# Patient Record
Sex: MALE | Race: BLACK OR AFRICAN AMERICAN | NOT HISPANIC OR LATINO | URBAN - METROPOLITAN AREA
[De-identification: names, ages, dates, MRNs, and addresses within clinical notes are randomized per-mention and may not be internally consistent; named-entity substitution may affect disease eponyms.]

---

## 2023-05-31 LAB
ALANINE AMINOTRANSFERASE (SGPT) (U/L) IN SER/PLAS: 14 U/L (ref 10–52)
ALBUMIN (G/DL) IN SER/PLAS: 4.1 G/DL (ref 3.4–5)
ALKALINE PHOSPHATASE (U/L) IN SER/PLAS: 84 U/L (ref 33–136)
ANION GAP IN SER/PLAS: 11 MMOL/L (ref 10–20)
ASPARTATE AMINOTRANSFERASE (SGOT) (U/L) IN SER/PLAS: 14 U/L (ref 9–39)
BILIRUBIN TOTAL (MG/DL) IN SER/PLAS: 0.5 MG/DL (ref 0–1.2)
CALCIUM (MG/DL) IN SER/PLAS: 9.8 MG/DL (ref 8.6–10.6)
CARBON DIOXIDE, TOTAL (MMOL/L) IN SER/PLAS: 29 MMOL/L (ref 21–32)
CHLORIDE (MMOL/L) IN SER/PLAS: 104 MMOL/L (ref 98–107)
CHOLESTEROL (MG/DL) IN SER/PLAS: 127 MG/DL (ref 0–199)
CHOLESTEROL IN HDL (MG/DL) IN SER/PLAS: 33.8 MG/DL
CHOLESTEROL/HDL RATIO: 3.8
CREATININE (MG/DL) IN SER/PLAS: 1.25 MG/DL (ref 0.5–1.3)
ERYTHROCYTE DISTRIBUTION WIDTH (RATIO) BY AUTOMATED COUNT: 13.6 % (ref 11.5–14.5)
ERYTHROCYTE MEAN CORPUSCULAR HEMOGLOBIN CONCENTRATION (G/DL) BY AUTOMATED: 33.7 G/DL (ref 32–36)
ERYTHROCYTE MEAN CORPUSCULAR VOLUME (FL) BY AUTOMATED COUNT: 87 FL (ref 80–100)
ERYTHROCYTES (10*6/UL) IN BLOOD BY AUTOMATED COUNT: 5.19 X10E12/L (ref 4.5–5.9)
ESTIMATED AVERAGE GLUCOSE FOR HBA1C: 131 MG/DL
GFR MALE: 63 ML/MIN/1.73M2
GLUCOSE (MG/DL) IN SER/PLAS: 101 MG/DL (ref 74–99)
HEMATOCRIT (%) IN BLOOD BY AUTOMATED COUNT: 45.1 % (ref 41–52)
HEMOGLOBIN (G/DL) IN BLOOD: 15.2 G/DL (ref 13.5–17.5)
HEMOGLOBIN A1C/HEMOGLOBIN TOTAL IN BLOOD: 6.2 %
LDL: 80 MG/DL (ref 0–99)
LEUKOCYTES (10*3/UL) IN BLOOD BY AUTOMATED COUNT: 9.7 X10E9/L (ref 4.4–11.3)
NRBC (PER 100 WBCS) BY AUTOMATED COUNT: 0 /100 WBC (ref 0–0)
PLATELETS (10*3/UL) IN BLOOD AUTOMATED COUNT: 307 X10E9/L (ref 150–450)
POTASSIUM (MMOL/L) IN SER/PLAS: 4.4 MMOL/L (ref 3.5–5.3)
PROSTATE SPECIFIC ANTIGEN,SCREEN: 1.1 NG/ML (ref 0–4)
PROTEIN TOTAL: 7 G/DL (ref 6.4–8.2)
SODIUM (MMOL/L) IN SER/PLAS: 140 MMOL/L (ref 136–145)
TRIGLYCERIDE (MG/DL) IN SER/PLAS: 68 MG/DL (ref 0–149)
UREA NITROGEN (MG/DL) IN SER/PLAS: 13 MG/DL (ref 6–23)
VLDL: 14 MG/DL (ref 0–40)

## 2023-11-12 PROBLEM — R21 RASH OF HANDS: Status: ACTIVE | Noted: 2023-11-12

## 2023-11-12 PROBLEM — N52.9 ERECTILE DYSFUNCTION: Status: ACTIVE | Noted: 2023-11-12

## 2023-11-12 PROBLEM — H52.203 ASTIGMATISM OF BOTH EYES: Status: ACTIVE | Noted: 2023-11-12

## 2023-11-12 PROBLEM — E11.9 DIABETES MELLITUS TYPE 2 WITHOUT RETINOPATHY (MULTI): Status: ACTIVE | Noted: 2023-11-12

## 2023-11-12 PROBLEM — B02.9 SHINGLES: Status: ACTIVE | Noted: 2023-11-12

## 2023-11-12 PROBLEM — L30.9 ECZEMA: Status: ACTIVE | Noted: 2023-11-12

## 2023-11-12 PROBLEM — H52.03 HYPEROPIA WITH PRESBYOPIA OF BOTH EYES: Status: ACTIVE | Noted: 2023-11-12

## 2023-11-12 PROBLEM — I10 ESSENTIAL HYPERTENSION: Status: ACTIVE | Noted: 2023-11-12

## 2023-11-12 PROBLEM — E11.42 DIABETIC PERIPHERAL NEUROPATHY (MULTI): Status: ACTIVE | Noted: 2023-11-12

## 2023-11-12 PROBLEM — M20.5X1 HALLUX LIMITUS, RIGHT: Status: ACTIVE | Noted: 2023-11-12

## 2023-11-12 PROBLEM — Z96.1 PSEUDOPHAKIA OF RIGHT EYE: Status: ACTIVE | Noted: 2023-11-12

## 2023-11-12 PROBLEM — B35.1 ONYCHOMYCOSIS: Status: ACTIVE | Noted: 2023-11-12

## 2023-11-12 PROBLEM — N43.40 SPERMATOCELE: Status: ACTIVE | Noted: 2023-11-12

## 2023-11-12 PROBLEM — E11.9 DIABETES MELLITUS (MULTI): Status: ACTIVE | Noted: 2023-11-12

## 2023-11-12 PROBLEM — H60.90 OTITIS EXTERNA: Status: ACTIVE | Noted: 2023-11-12

## 2023-11-12 PROBLEM — H52.4 HYPEROPIA WITH PRESBYOPIA OF BOTH EYES: Status: ACTIVE | Noted: 2023-11-12

## 2023-11-12 PROBLEM — E78.5 HYPERLIPIDEMIA: Status: ACTIVE | Noted: 2023-11-12

## 2023-11-12 PROBLEM — S06.0XAA CONCUSSION: Status: ACTIVE | Noted: 2023-11-12

## 2023-11-12 PROBLEM — G47.9 SLEEP DIFFICULTIES: Status: ACTIVE | Noted: 2023-11-12

## 2023-11-12 RX ORDER — CICLOPIROX 80 MG/ML
SOLUTION TOPICAL
COMMUNITY
Start: 2020-01-24 | End: 2024-01-10 | Stop reason: WASHOUT

## 2023-11-12 RX ORDER — ATORVASTATIN CALCIUM 20 MG/1
1 TABLET, FILM COATED ORAL NIGHTLY
COMMUNITY
Start: 2022-09-28

## 2023-11-12 RX ORDER — ASPIRIN 81 MG/1
1 TABLET ORAL DAILY
COMMUNITY
Start: 2018-04-02

## 2023-11-12 RX ORDER — BLOOD SUGAR DIAGNOSTIC
STRIP MISCELLANEOUS 2 TIMES DAILY
COMMUNITY
Start: 2018-04-02 | End: 2023-11-13 | Stop reason: ALTCHOICE

## 2023-11-12 RX ORDER — INSULIN GLARGINE 100 [IU]/ML
20 INJECTION, SOLUTION SUBCUTANEOUS NIGHTLY
COMMUNITY
Start: 2022-07-08 | End: 2024-01-10 | Stop reason: WASHOUT

## 2023-11-12 RX ORDER — NAPROXEN 500 MG/1
1 TABLET ORAL
COMMUNITY
Start: 2015-10-02 | End: 2024-01-10 | Stop reason: WASHOUT

## 2023-11-12 RX ORDER — POLYETHYLENE GLYCOL 3350, SODIUM SULFATE, SODIUM CHLORIDE, POTASSIUM CHLORIDE, SODIUM ASCORBATE, AND ASCORBIC ACID 7.5-2.691G
KIT ORAL
COMMUNITY
Start: 2020-06-19 | End: 2023-11-13 | Stop reason: ALTCHOICE

## 2023-11-12 RX ORDER — SIMVASTATIN 20 MG/1
1 TABLET, FILM COATED ORAL NIGHTLY
COMMUNITY
Start: 2018-04-26 | End: 2023-11-13 | Stop reason: ALTCHOICE

## 2023-11-12 RX ORDER — ALBUTEROL SULFATE 90 UG/1
2 AEROSOL, METERED RESPIRATORY (INHALATION)
COMMUNITY
Start: 2015-02-12 | End: 2024-01-10 | Stop reason: WASHOUT

## 2023-11-12 RX ORDER — INDOMETHACIN 25 MG/1
1 CAPSULE ORAL
COMMUNITY
Start: 2017-01-06 | End: 2023-11-13 | Stop reason: ALTCHOICE

## 2023-11-12 RX ORDER — TADALAFIL 5 MG/1
1 TABLET ORAL DAILY
COMMUNITY
Start: 2014-10-20

## 2023-11-12 RX ORDER — HYDROCHLOROTHIAZIDE 12.5 MG/1
1 TABLET ORAL DAILY
COMMUNITY
Start: 2020-06-24 | End: 2024-01-08

## 2023-11-12 RX ORDER — ALCOHOL 2.38 KG/3.79L
1 GEL TOPICAL DAILY
COMMUNITY
Start: 2020-01-24 | End: 2023-11-13 | Stop reason: ALTCHOICE

## 2023-11-12 RX ORDER — METFORMIN HYDROCHLORIDE 500 MG/1
1 TABLET ORAL EVERY 12 HOURS
COMMUNITY
Start: 2018-04-02 | End: 2024-01-10 | Stop reason: SDUPTHER

## 2023-11-12 RX ORDER — GABAPENTIN 100 MG/1
1 CAPSULE ORAL 3 TIMES DAILY
COMMUNITY
Start: 2020-02-28 | End: 2023-11-13 | Stop reason: ALTCHOICE

## 2023-11-12 RX ORDER — NEOMYCIN SULFATE, POLYMYXIN B SULFATE, HYDROCORTISONE 3.5; 10000; 1 MG/ML; [USP'U]/ML; MG/ML
3 SOLUTION/ DROPS AURICULAR (OTIC) 4 TIMES DAILY
COMMUNITY
Start: 2021-04-06 | End: 2023-11-13 | Stop reason: SDUPTHER

## 2023-11-12 RX ORDER — TRIAMCINOLONE ACETONIDE 1 MG/G
OINTMENT TOPICAL 2 TIMES DAILY
COMMUNITY
Start: 2018-04-25 | End: 2024-01-10 | Stop reason: WASHOUT

## 2023-11-12 RX ORDER — SEMAGLUTIDE 1.34 MG/ML
INJECTION, SOLUTION SUBCUTANEOUS
COMMUNITY
Start: 2022-09-28 | End: 2024-01-10 | Stop reason: SDUPTHER

## 2023-11-12 RX ORDER — LISINOPRIL 10 MG/1
1 TABLET ORAL DAILY
COMMUNITY
Start: 2020-06-24 | End: 2023-11-13 | Stop reason: SDUPTHER

## 2023-11-12 RX ORDER — VALACYCLOVIR HYDROCHLORIDE 1 G/1
1 TABLET, FILM COATED ORAL 3 TIMES DAILY
COMMUNITY
Start: 2018-05-28 | End: 2024-01-10 | Stop reason: WASHOUT

## 2023-11-12 RX ORDER — GLIPIZIDE 5 MG/1
1 TABLET, FILM COATED, EXTENDED RELEASE ORAL DAILY
COMMUNITY
Start: 2021-05-05 | End: 2023-11-13 | Stop reason: ALTCHOICE

## 2023-11-12 RX ORDER — PROMETHAZINE HYDROCHLORIDE 25 MG/1
1-2 TABLET ORAL EVERY 4 HOURS PRN
COMMUNITY
Start: 2018-01-27 | End: 2024-01-10 | Stop reason: WASHOUT

## 2023-11-13 ENCOUNTER — OFFICE VISIT (OUTPATIENT)
Dept: PRIMARY CARE | Facility: CLINIC | Age: 68
End: 2023-11-13
Payer: MEDICARE

## 2023-11-13 VITALS
HEART RATE: 86 BPM | HEIGHT: 76 IN | BODY MASS INDEX: 33 KG/M2 | SYSTOLIC BLOOD PRESSURE: 154 MMHG | DIASTOLIC BLOOD PRESSURE: 89 MMHG | WEIGHT: 271 LBS

## 2023-11-13 DIAGNOSIS — I10 ESSENTIAL HYPERTENSION: ICD-10-CM

## 2023-11-13 DIAGNOSIS — H60.63 CHRONIC NON-INFECTIVE OTITIS EXTERNA OF BOTH EARS, UNSPECIFIED TYPE: Primary | ICD-10-CM

## 2023-11-13 PROCEDURE — 1036F TOBACCO NON-USER: CPT | Performed by: FAMILY MEDICINE

## 2023-11-13 PROCEDURE — 99213 OFFICE O/P EST LOW 20 MIN: CPT | Performed by: FAMILY MEDICINE

## 2023-11-13 PROCEDURE — 3077F SYST BP >= 140 MM HG: CPT | Performed by: FAMILY MEDICINE

## 2023-11-13 PROCEDURE — 3079F DIAST BP 80-89 MM HG: CPT | Performed by: FAMILY MEDICINE

## 2023-11-13 PROCEDURE — 3044F HG A1C LEVEL LT 7.0%: CPT | Performed by: FAMILY MEDICINE

## 2023-11-13 PROCEDURE — 4010F ACE/ARB THERAPY RXD/TAKEN: CPT | Performed by: FAMILY MEDICINE

## 2023-11-13 RX ORDER — FLASH GLUCOSE SENSOR
KIT MISCELLANEOUS
COMMUNITY
Start: 2023-11-04 | End: 2024-01-10 | Stop reason: SDUPTHER

## 2023-11-13 RX ORDER — POLYETHYLENE GLYCOL 3350, SODIUM CHLORIDE, SODIUM BICARBONATE, POTASSIUM CHLORIDE 420; 11.2; 5.72; 1.48 G/4L; G/4L; G/4L; G/4L
POWDER, FOR SOLUTION ORAL
COMMUNITY
Start: 2023-06-06 | End: 2023-11-13 | Stop reason: ALTCHOICE

## 2023-11-13 RX ORDER — NEOMYCIN SULFATE, POLYMYXIN B SULFATE, HYDROCORTISONE 3.5; 10000; 1 MG/ML; [USP'U]/ML; MG/ML
3 SOLUTION/ DROPS AURICULAR (OTIC) 3 TIMES DAILY
Qty: 10 ML | Refills: 0 | Status: SHIPPED | OUTPATIENT
Start: 2023-11-13 | End: 2024-05-07 | Stop reason: SDUPTHER

## 2023-11-13 RX ORDER — ATENOLOL 100 MG/1
1 TABLET ORAL DAILY
COMMUNITY
Start: 2015-01-22 | End: 2023-11-13 | Stop reason: ALTCHOICE

## 2023-11-13 RX ORDER — LISINOPRIL 10 MG/1
10 TABLET ORAL DAILY
Qty: 90 TABLET | Refills: 3 | Status: SHIPPED | OUTPATIENT
Start: 2023-11-13

## 2023-11-13 NOTE — PROGRESS NOTES
Pt is here for follow up    Chief Complaint:   No chief complaint on file.     History Of Present Illness:    Nomi Mercado is a 68 y.o. male with a history of HTN, DM2, right eye cataract removed and stent placed, gout, shingles, presenting for an office visit.        Here for a follow up. Last visit July 2023. Possibly some outer ear infection sypmtoms.      DM: blood sugars in the 300+. Sometimes he feels dizzy, also he feels sleepy. 10/28/21: 174 in the office. July 2022 update: his bs were in the 600+ and he was admitted. And then discharged and is following up here.   insluine glargine: 20U at night. agreed to go to 30U at night.   Nov 2023 update: bs in the lower 90s, after eating up to 180 and then they come back down. Has a CGM. On ozempic now, off insulin. Has an endocrinologist.         He is on medicare now.      June 2, 2020- first shingrix vaccine.  Sept 2, 2020- second shingrix vaccine.        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     COVID: vaccinated.    .       Review of Systems:    Negative  Constitutional: fever, chills, night sweats, unexpected weight change, changes in sleep  Eyes: loss of vision, double vision, floaters  Ear/Nose/Throat/Mouth: sore throat, hearing changes, sinus congestion  Cardiovascular: chest pain, chest heaviness, palpitations, swelling in ankles  Psychological: feelings of depression, feelings of anxiety.  Endocrine: excessive thirst, feeling too hot, feeling too cold, feeling tired, fatigue  Hematologic/Lymphatic: swollen glands, blood clotting problems, easy bruising.   Respiratory: shortness of breath, difficulty breathing, frequent cough, wheezing  Neurological: loss of consciousness, tremors, dizzy spells, numbness, tingling.  Gastrointestinal: abdominal pain, nausea, vomiting, indigestion, heartburn, sour taste in throat when waking up, constipation, diarrhea, bloody stools, loss of bowel control  Genitourinary: urinary incontinence, increased urinary  "frequency, painful urination, blood in urine  Skin: Rash, lumps or bumps, worrisome moles  Musculoskeletal: bone pain, muscle pain, joint pain  Sexual: sexual health concerns      Positive  Psychological: feeling generally happy, feeling safe at home      Last Recorded Vitals:  Vitals:    11/13/23 1411   BP: 154/89   Pulse: 86   Weight: 123 kg (271 lb)   Height: 1.93 m (6' 4\")       Past Medical History:  He has no past medical history on file.    Past Surgical History:  He has a past surgical history that includes Other surgical history (01/24/2020).      Social History:  He has no history on file for tobacco use, alcohol use, and drug use.    Family History:  Family History   Problem Relation Name Age of Onset    No Known Problems Mother      No Known Problems Father      No Known Problems Sister      No Known Problems Brother          Allergies:  Sulfamethoxazole-trimethoprim    Outpatient Medications:  Current Outpatient Medications   Medication Instructions    albuterol (ProAir HFA) 90 mcg/actuation inhaler 2 puffs, inhalation, As directed    aspirin 81 mg EC tablet 1 tablet, oral, Daily    atenolol (Tenormin) 100 mg tablet 1 tablet, oral, Daily    atorvastatin (Lipitor) 20 mg tablet 1 tablet, oral, Nightly    ciclopirox (Penlac) 8 % solution Topical, APPLY AND GENTLY MASSAGE INTO AFFECTED AREA(S)    FreeStyle Brittany 2 Sensor kit TEST AS DIRECTED    gabapentin (Neurontin) 100 mg capsule 1 capsule, oral, 3 times daily    glipiZIDE XL (Glucotrol XL) 5 mg 24 hr tablet 1 tablet, oral, Daily    hydroCHLOROthiazide (HYDRODiuril) 12.5 mg tablet 1 tablet, oral, Daily    indomethacin (Indocin) 25 mg capsule 1 capsule, oral    insulin glargine (BASAGLAR KWIKPEN U-100 INSULIN) 20 Units, subcutaneous, Nightly    wottzbtou-K6-krH30-algal oil (Metanx, algal oil,) 3 mg-35 mg-2 mg -90.314 mg capsule 1 capsule, oral, Daily    lisinopril 10 mg tablet 1 tablet, oral, Daily    metFORMIN (Glucophage) 500 mg tablet 1 tablet, oral, " Every 12 hours, With food     naproxen (Naprosyn) 500 mg tablet 1 tablet, oral, As directed.    neomycin-polymyxin-HC (Cortisporin) otic solution 3 drops, otic (ear), 4 times daily    OneTouch Ultra Test strip 2 times daily    peg-sod sul-NaCl-KCl-asb-C (MoviPrep) 100-7.5-2.691 gram solution oral, Use as directed.    polyethylene glycol-electrolytes 420 gram solution MIX AND DRINK 4000ML AS PER SPLIT DOSE  ENDOSCOPY INSTRUCTIONS    promethazine (Phenergan) 25 mg tablet 1-2 tablets, oral, Every 4 hours PRN    semaglutide (Ozempic) 0.25 mg or 0.5 mg(2 mg/1.5 mL) pen injector subcutaneous, Week 1 through week 4 : 0.25 mg once weekly. Week 5 through week 8: 0.5 mg once weekly. Week 9 through week 12: 1 mg once weekly.    simvastatin (Zocor) 20 mg tablet 1 tablet, oral, Nightly    tadalafil (Cialis) 5 mg tablet 1 tablet, oral, Daily    triamcinolone (Kenalog) 0.1 % ointment Topical, 2 times daily, APPLY sparingly to affected area    valACYclovir (Valtrex) 1 gram tablet 1 tablet, oral, 3 times daily       Physical Exam:  GENERAL: Well developed, well nourished, alert and cooperative, and appears to be in no acute distress.  PSYCH: mood pleasant and appropriate  HEAD: normocephalic  EYES: PERRL, EOMI. vision is grossly intact.   EARS: Hearing grossly intact. External auditory canals min erythema. Right tympanic membrane clear. Left tympanic membrane clear.  NOSE:  Nares patent b/l. No bleeding nasal polyps. No nasal discharge.  THROAT:  Oral cavity and pharynx clear. No inflammation, swelling, exudate, or lesions.  Teeth and gingiva in good general condition.  NECK: Neck supple, non-tender. No masses, no thyromegaly. No anterior cervical lymphadenopathy.  CARDIAC: Normal S1 and S2. No murmur. Rhythm is regular. Brisk capillary refill. No carotid bruits. No appreciable JVD.  LUNGS: Clear to auscultation without rales, rhonchi, wheezing.  ABD: soft, nontender, no masses palpated. No HSM. No R/G. No CVA tenderness to  palpation b/l  GAIT: Normal  EXTREMITIES: Extremities are warm and well perfused. Peripheral pulses intact. No varicosities. No cyanosis, no pallor. No peripheral edema.   NEUROLOGICAL: CN II-XII grossly intact. Strength and sensation symmetric and intact throughout all 4 extremities.   SKIN: Skin normal color, texture and turgor with no lesions or eruptions.     Last Labs:  CBC -  Lab Results   Component Value Date    WBC 9.7 05/31/2023    HGB 15.2 05/31/2023    HCT 45.1 05/31/2023    MCV 87 05/31/2023     05/31/2023       CMP -  Lab Results   Component Value Date    CALCIUM 9.8 05/31/2023    PROT 7.0 05/31/2023    ALBUMIN 4.1 05/31/2023    AST 14 05/31/2023    ALT 14 05/31/2023    ALKPHOS 84 05/31/2023    BILITOT 0.5 05/31/2023       LIPID PANEL -   Lab Results   Component Value Date    CHOL 127 05/31/2023    TRIG 68 05/31/2023    HDL 33.8 (A) 05/31/2023    CHHDL 3.8 05/31/2023    LDLF 80 05/31/2023    VLDL 14 05/31/2023         Lab Results   Component Value Date    HGBA1C 6.2 (A) 05/31/2023    HGBA1C 11.9 (H) 07/07/2022       Assessment/Plan   Problem List Items Addressed This Visit             ICD-10-CM    Essential hypertension I10    Otitis externa - Primary H60.90     For your outer ear infection, eardrops refilled.    Lisinopril refilled for your blood pressure.    Blood sugars seem to be much better controlled, follow-up with your endocrinologist for any Ozempic refills.    We had a discussion today about commercial insurance, as long as you continue with your commercial insurance follow-up in 6 months here with me.     Jamal Madrid, DO

## 2024-01-07 DIAGNOSIS — I10 ESSENTIAL HYPERTENSION: Primary | ICD-10-CM

## 2024-01-08 RX ORDER — HYDROCHLOROTHIAZIDE 12.5 MG/1
12.5 TABLET ORAL DAILY
Qty: 100 TABLET | Refills: 2 | Status: SHIPPED | OUTPATIENT
Start: 2024-01-08

## 2024-01-10 DIAGNOSIS — E11.9 TYPE 2 DIABETES MELLITUS WITHOUT COMPLICATION, UNSPECIFIED WHETHER LONG TERM INSULIN USE (MULTI): Primary | ICD-10-CM

## 2024-01-10 RX ORDER — METFORMIN HYDROCHLORIDE 500 MG/1
500 TABLET ORAL EVERY 12 HOURS
Qty: 180 TABLET | Refills: 1 | Status: SHIPPED | OUTPATIENT
Start: 2024-01-10

## 2024-01-10 RX ORDER — SEMAGLUTIDE 1.34 MG/ML
0.5 INJECTION, SOLUTION SUBCUTANEOUS
Qty: 1 EACH | Refills: 1 | Status: SHIPPED | OUTPATIENT
Start: 2024-01-10 | End: 2024-01-17 | Stop reason: ALTCHOICE

## 2024-01-10 RX ORDER — FLASH GLUCOSE SENSOR
1 KIT MISCELLANEOUS AS NEEDED
Qty: 1 EACH | Refills: 11 | Status: SHIPPED | OUTPATIENT
Start: 2024-01-10

## 2024-01-12 ENCOUNTER — TELEPHONE (OUTPATIENT)
Dept: PRIMARY CARE | Facility: CLINIC | Age: 69
End: 2024-01-12
Payer: COMMERCIAL

## 2024-01-12 DIAGNOSIS — E11.9 DIABETES MELLITUS TYPE 2 WITHOUT RETINOPATHY (MULTI): Primary | ICD-10-CM

## 2024-01-13 RX ORDER — SEMAGLUTIDE 1.34 MG/ML
1 INJECTION, SOLUTION SUBCUTANEOUS
Qty: 3 ML | Refills: 3 | Status: SHIPPED | OUTPATIENT
Start: 2024-01-13 | End: 2024-01-16 | Stop reason: SDUPTHER

## 2024-01-16 DIAGNOSIS — E11.9 TYPE 2 DIABETES MELLITUS WITHOUT COMPLICATION, UNSPECIFIED WHETHER LONG TERM INSULIN USE (MULTI): ICD-10-CM

## 2024-01-16 DIAGNOSIS — E11.9 DIABETES MELLITUS TYPE 2 WITHOUT RETINOPATHY (MULTI): ICD-10-CM

## 2024-01-16 RX ORDER — SEMAGLUTIDE 1.34 MG/ML
1 INJECTION, SOLUTION SUBCUTANEOUS
Qty: 3 ML | Refills: 3 | Status: SHIPPED | OUTPATIENT
Start: 2024-01-16 | End: 2024-01-17 | Stop reason: ALTCHOICE

## 2024-01-17 DIAGNOSIS — E11.9 TYPE 2 DIABETES MELLITUS WITHOUT COMPLICATION, WITHOUT LONG-TERM CURRENT USE OF INSULIN (MULTI): Primary | ICD-10-CM

## 2024-01-17 DIAGNOSIS — E11.9 DIABETES MELLITUS TYPE 2 WITHOUT RETINOPATHY (MULTI): ICD-10-CM

## 2024-01-17 RX ORDER — SEMAGLUTIDE 1.34 MG/ML
1 INJECTION, SOLUTION SUBCUTANEOUS
Qty: 3 ML | Refills: 3 | Status: SHIPPED | OUTPATIENT
Start: 2024-01-17 | End: 2024-01-17 | Stop reason: WASHOUT

## 2024-03-22 DIAGNOSIS — E11.69 TYPE 2 DIABETES MELLITUS WITH OTHER SPECIFIED COMPLICATION (MULTI): Primary | ICD-10-CM

## 2024-04-05 ENCOUNTER — LAB (OUTPATIENT)
Dept: LAB | Facility: LAB | Age: 69
End: 2024-04-05
Payer: COMMERCIAL

## 2024-04-05 DIAGNOSIS — E11.69 TYPE 2 DIABETES MELLITUS WITH OTHER SPECIFIED COMPLICATION (MULTI): ICD-10-CM

## 2024-04-05 PROCEDURE — 82043 UR ALBUMIN QUANTITATIVE: CPT

## 2024-04-05 PROCEDURE — 36415 COLL VENOUS BLD VENIPUNCTURE: CPT

## 2024-04-05 PROCEDURE — 82570 ASSAY OF URINE CREATININE: CPT

## 2024-04-05 PROCEDURE — 83036 HEMOGLOBIN GLYCOSYLATED A1C: CPT

## 2024-04-05 PROCEDURE — 82565 ASSAY OF CREATININE: CPT

## 2024-04-06 LAB
CREAT SERPL-MCNC: 1.39 MG/DL (ref 0.5–1.3)
CREAT UR-MCNC: 266.7 MG/DL (ref 20–370)
EGFRCR SERPLBLD CKD-EPI 2021: 55 ML/MIN/1.73M*2
EST. AVERAGE GLUCOSE BLD GHB EST-MCNC: 126 MG/DL
HBA1C MFR BLD: 6 %
MICROALBUMIN UR-MCNC: <7 MG/L
MICROALBUMIN/CREAT UR: NORMAL MG/G{CREAT}

## 2024-05-07 ENCOUNTER — OFFICE VISIT (OUTPATIENT)
Dept: PRIMARY CARE | Facility: CLINIC | Age: 69
End: 2024-05-07
Payer: COMMERCIAL

## 2024-05-07 VITALS
DIASTOLIC BLOOD PRESSURE: 99 MMHG | WEIGHT: 273 LBS | BODY MASS INDEX: 33.24 KG/M2 | SYSTOLIC BLOOD PRESSURE: 153 MMHG | HEIGHT: 76 IN | HEART RATE: 102 BPM

## 2024-05-07 DIAGNOSIS — Z12.11 ENCOUNTER FOR SCREENING FOR MALIGNANT NEOPLASM OF COLON: Primary | ICD-10-CM

## 2024-05-07 DIAGNOSIS — H60.63 CHRONIC NON-INFECTIVE OTITIS EXTERNA OF BOTH EARS, UNSPECIFIED TYPE: ICD-10-CM

## 2024-05-07 PROCEDURE — 3044F HG A1C LEVEL LT 7.0%: CPT | Performed by: FAMILY MEDICINE

## 2024-05-07 PROCEDURE — 3062F POS MACROALBUMINURIA REV: CPT | Performed by: FAMILY MEDICINE

## 2024-05-07 PROCEDURE — 99213 OFFICE O/P EST LOW 20 MIN: CPT | Performed by: FAMILY MEDICINE

## 2024-05-07 PROCEDURE — 1036F TOBACCO NON-USER: CPT | Performed by: FAMILY MEDICINE

## 2024-05-07 PROCEDURE — 3080F DIAST BP >= 90 MM HG: CPT | Performed by: FAMILY MEDICINE

## 2024-05-07 PROCEDURE — 4010F ACE/ARB THERAPY RXD/TAKEN: CPT | Performed by: FAMILY MEDICINE

## 2024-05-07 PROCEDURE — 1159F MED LIST DOCD IN RCRD: CPT | Performed by: FAMILY MEDICINE

## 2024-05-07 PROCEDURE — 3077F SYST BP >= 140 MM HG: CPT | Performed by: FAMILY MEDICINE

## 2024-05-07 RX ORDER — NEOMYCIN SULFATE, POLYMYXIN B SULFATE, HYDROCORTISONE 3.5; 10000; 1 MG/ML; [USP'U]/ML; MG/ML
3 SOLUTION/ DROPS AURICULAR (OTIC) 3 TIMES DAILY
Qty: 10 ML | Refills: 0 | Status: SHIPPED | OUTPATIENT
Start: 2024-05-07

## 2024-05-07 ASSESSMENT — ENCOUNTER SYMPTOMS
LOSS OF SENSATION IN FEET: 0
OCCASIONAL FEELINGS OF UNSTEADINESS: 0
DEPRESSION: 0

## 2024-05-07 NOTE — PROGRESS NOTES
Pt is here for fuv, pt has concerns of ear drops needed        Chief Complaint:  No chief complaint on file.  History Of Present Illness:   Nomi Mercado is a 69 y.o. male        Nomi Mercdao is a 69 y.o. male with a history of HTN, DM2, right eye cataract removed and stent placed, gout, shingles, presenting for an office visit.     Follow up from Nov 2023.     Ear drop refill- pt is NOT allergic to neomycin. For outer ear symptoms.         DM: blood sugars in the 300+. Sometimes he feels dizzy, also he feels sleepy. 10/28/21: 174 in the office. July 2022 update: his bs were in the 600+ and he was admitted. And then discharged and is following up here.   insluine glargine: 20U at night. agreed to go to 30U at night.   Nov 2023 update: bs in the lower 90s, after eating up to 180 and then they come back down. Has a CGM. On ozempic now, off insulin. Has an endocrinologist.        HTN- well controlled when takes medication    Healthcare team:  - endocrinology  - eye doctor- will see May 2024.     ALL:      FH: no FH cancer. No FH MI.     Tobacco: never        Flu shot:  - declines          Colonoscopy: he drank the meds and needed to reschedule due to inadequate prep. Agreed to reorder.     COVID: vaccinated.     June 2, 2020- first shingrix vaccine.  Sept 2, 2020- second shingrix vaccine.        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     Mood: pretty good     Sleep: takes z-quil to sleep     Sports: exercises on treadmill.                             Review of Systems:     Negative  Constitutional:   - fever   - chills   - night sweats  - unexpected weight change       Eyes:   - loss of vision  - double vision  - floaters     Ear/Nose/Throat/Mouth:   - sore throat  - hearing changes  - sinus congestion     Cardiovascular:   - chest pain  - chest heaviness  - palpitations  - swelling in ankles          Respiratory:   - shortness of breath  - difficulty breathing  - frequent cough  - wheezing    "  Neurological:   - loss of consciousness  - tremors  - dizzy spells  - numbness   - tingling     Gastrointestinal:   - abdominal pain  - nausea  - vomiting  - constipation  - diarrhea  - bloody stools  - loss of bowel control  - indigestion  - heartburn  - sour taste in throat when waking up     Genitourinary:   - urinary incontinence  - increased urinary frequency  - painful urination  - blood in urine     Skin:   - Rash  - lumps or bumps  - worrisome moles     Endocrine:   - excessive thirst  - feeling too hot  - feeling too cold        Hematologic/Lymphatic:   - swollen glands  - blood clotting problems  - easy bruising.     Psychological:   - feelings of depression  - feelings of anxiety.          Positive  Psychological:   - feeling generally happy  - feeling safe at home            Last Recorded Vitals:  Vitals:    05/07/24 1349   BP: (!) 153/99   Pulse: 102   Weight: 124 kg (273 lb)   Height: 1.93 m (6' 4\")        Past Medical History:  He has no past medical history on file.     Past Surgical History:  He has a past surgical history that includes Other surgical history (01/24/2020).     Social History:  He reports that he has quit smoking. His smoking use included cigarettes. He started smoking about 49 years ago. He has a 49.3 pack-year smoking history. He has never used smokeless tobacco. He reports that he does not currently use alcohol. He reports that he does not use drugs.     Family History:  Family History   Problem Relation Name Age of Onset    No Known Problems Mother      No Known Problems Father      No Known Problems Sister      No Known Problems Brother       Allergies:  Sulfamethoxazole, Neomycin, and Sulfamethoxazole-trimethoprim     Outpatient Medications:  Current Outpatient Medications   Medication Instructions    aspirin 81 mg EC tablet 1 tablet, oral, Daily    atorvastatin (Lipitor) 20 mg tablet 1 tablet, oral, Nightly    FreeStyle Brittany 2 Sensor kit 1 each, subcutaneous, As needed, Use " as instructed    hydroCHLOROthiazide (MICROZIDE) 12.5 mg, oral, Daily    lisinopril 10 mg, oral, Daily    metFORMIN (GLUCOPHAGE) 500 mg, oral, Every 12 hours, With food     neomycin-polymyxin-HC (Cortisporin) otic solution 3 drops, otic (ear), 3 times daily    semaglutide 0.25 mg, subcutaneous, Once Weekly    tadalafil (Cialis) 5 mg tablet 1 tablet, oral, Daily        Physical Exam:  GENERAL: Well developed, well nourished, alert and cooperative, and appears to be in no acute distress.     PSYCH: mood pleasant and appropriate     HEAD: normocephalic     EYES: PERRL, EOMI. vision is grossly intact.          NOSE:  Nares patent b/l.   No bleeding nasal polyps. No nasal discharge.     THROAT:    Oropharynx clear.  No inflammation, swelling, exudate, or lesions.   Teeth and gingiva in good general condition.     NECK: Neck supple, non-tender.   No masses, no thyromegaly.  No anterior cervical lymphadenopathy.     CARDIAC:   RRR.   No murmur.       LUNGS: Good respiratory effort.  Clear to auscultation b/l without rales, rhonchi, wheezing.     ABD: soft, nontender  no masses palpated.     GAIT: Normal             EXTREMITIES: All 4 extremities are warm and well perfused.        NEUROLOGICAL: Strength and sensation symmetric and intact throughout all 4 extremities.               Assessment/Plan   Problem List Items Addressed This Visit             ICD-10-CM    Otitis externa H60.90     Other Visit Diagnoses         Codes    Encounter for screening for malignant neoplasm of colon    -  Primary Z12.11             Eardrop refill done.  You are not allergic to neomycin per report    Colonoscopy reordered.    Elevated blood pressure, at your follow-up can review home blood pressure readings and consider changing regimen.    Continue with your endocrinologist.    Follow-up in 6 months.     Jamal Madrid, DO

## 2024-06-10 ENCOUNTER — TELEPHONE (OUTPATIENT)
Dept: PRIMARY CARE | Facility: CLINIC | Age: 69
End: 2024-06-10
Payer: COMMERCIAL

## 2024-06-10 DIAGNOSIS — Z87.39 HISTORY OF GOUT: Primary | ICD-10-CM

## 2024-06-10 PROBLEM — D29.20 BENIGN NEOPLASM OF TESTIS: Status: ACTIVE | Noted: 2024-06-10

## 2024-06-10 PROBLEM — H25.11 NUCLEAR SCLEROSIS OF RIGHT EYE: Status: ACTIVE | Noted: 2018-02-28

## 2024-06-10 PROBLEM — H52.00 HYPEROPIA: Status: ACTIVE | Noted: 2023-11-12

## 2024-06-10 PROBLEM — E11.65 HYPERGLYCEMIA DUE TO DIABETES MELLITUS (MULTI): Status: ACTIVE | Noted: 2022-07-06

## 2024-06-10 PROBLEM — H60.63 CHRONIC NON-INFECTIVE OTITIS EXTERNA OF BOTH EARS: Status: ACTIVE | Noted: 2024-06-10

## 2024-06-10 NOTE — TELEPHONE ENCOUNTER
Pt is requesting indomethacin 25mg, his pharmacy called about it as well. He was last prescribed it in 2021. Thank you.

## 2024-06-11 RX ORDER — INDOMETHACIN 25 MG/1
25 CAPSULE ORAL 3 TIMES DAILY PRN
Qty: 30 CAPSULE | Refills: 0 | Status: SHIPPED | OUTPATIENT
Start: 2024-06-11 | End: 2025-06-11

## 2024-06-25 ENCOUNTER — TELEMEDICINE (OUTPATIENT)
Dept: ENDOCRINOLOGY | Facility: HOSPITAL | Age: 69
End: 2024-06-25
Payer: COMMERCIAL

## 2024-06-25 DIAGNOSIS — E11.9 TYPE 2 DIABETES MELLITUS WITHOUT COMPLICATION, WITHOUT LONG-TERM CURRENT USE OF INSULIN (MULTI): ICD-10-CM

## 2024-06-25 PROCEDURE — 3044F HG A1C LEVEL LT 7.0%: CPT | Performed by: STUDENT IN AN ORGANIZED HEALTH CARE EDUCATION/TRAINING PROGRAM

## 2024-06-25 PROCEDURE — 99213 OFFICE O/P EST LOW 20 MIN: CPT | Performed by: STUDENT IN AN ORGANIZED HEALTH CARE EDUCATION/TRAINING PROGRAM

## 2024-06-25 PROCEDURE — 1159F MED LIST DOCD IN RCRD: CPT | Performed by: STUDENT IN AN ORGANIZED HEALTH CARE EDUCATION/TRAINING PROGRAM

## 2024-06-25 PROCEDURE — 3062F POS MACROALBUMINURIA REV: CPT | Performed by: STUDENT IN AN ORGANIZED HEALTH CARE EDUCATION/TRAINING PROGRAM

## 2024-06-25 PROCEDURE — 4010F ACE/ARB THERAPY RXD/TAKEN: CPT | Performed by: STUDENT IN AN ORGANIZED HEALTH CARE EDUCATION/TRAINING PROGRAM

## 2024-06-25 NOTE — PROGRESS NOTES
Patient coming in for follow up for T2DM    Subjective   Nomi Mercado is a 69 y.o. male who presents for follow up for Type 2 diabetes mellitus.   Lab Results   Component Value Date    HGBA1C 6.0 (H) 04/05/2024    Mr. Mercado is a 69 year old M with hx of uncontrolled T2DM and HTN referred for Diabetes management  date of diagnosis: At least for 4 years. HbA1c: July 2022 and results: 11.9%. Most recent 6% in April 2024  Last seen in July 2022 and was started on ozempic  No hx of pancreatitis  Has adjusted his diet and exercise  Per patient lost 20-25lbs   At 1am 146 4:40 139 5:29 am was 205 had a phone call 10:30 165  Current DM Regimen:. Metformin 500 mg OD  Ozempic 0.25 mg once weekly (Decrease by his PCP)  Was on 1 mg but was having low BG in 70s so he decreased it to 0.5 mg  No abdominal pain  No nausea or vomiting  Decreased appetite  Lisinopril 10 mg daily     Diabetes Surveillance: Eye exam: 2 weeks had a dialated eye exam  Foot exam/podiatrist: a year ago   Diabetes Complications:   Opthalmic: Has not been found to have any eye complications   Cardiovascular: no coronary artery bypass graft and no coronary artery disease. On atorvastatin LDL: 80  Renal: no nephropathy and no end stage renal disease. UACR 2 months ago  Neurologic: In both feet pain in both toes (started getting Bunions)      Breakfast: May eggs, gritz and toast 1/2 cafe coffee  Lunch: sometimes skips lunch  Snacks carorots celery 1/2 bananna once in a while mac donalds  Dinner: Chicken and shrimp gumbo with brown rice, sweet potatoes    Review of Systems  all pertinent systems reviewed and are otherwise negative   Objective   Visit Vitals  Smoking Status Former      Physical Exam  No acute distress    Lab Review  Glucose (mg/dL)   Date Value   05/31/2023 101 (H)   04/29/2021 270 (H)   07/25/2018 115 (H)     Hemoglobin A1C (%)   Date Value   04/05/2024 6.0 (H)   05/31/2023 6.2 (A)   07/07/2022 11.9 (H)   04/29/2021 11.5  "  06/02/2020 7.4     Bicarbonate (mmol/L)   Date Value   05/31/2023 29   04/29/2021 27   07/25/2018 31     Urea Nitrogen (mg/dL)   Date Value   05/31/2023 13   04/29/2021 18   07/25/2018 25 (H)     Creatinine (mg/dL)   Date Value   04/05/2024 1.39 (H)   05/31/2023 1.25   04/29/2021 1.21   07/25/2018 1.79 (H)     Lab Results   Component Value Date    CHOL 127 05/31/2023    CHOL 199 04/29/2021    CHOL 176 06/02/2020     Lab Results   Component Value Date    HDL 33.8 (A) 05/31/2023    HDL 34.0 (A) 04/29/2021    HDL 37.4 (A) 06/02/2020     No results found for: \"LDLCALC\"  Lab Results   Component Value Date    TRIG 68 05/31/2023    TRIG 113 04/29/2021    TRIG 111 06/02/2020     No components found for: \"CHOLHDL\"   Lab Results   Component Value Date    TSH 0.57 06/02/2020     No results found for: \"ALBUR\", \"[id number]\"       Assessment/Plan    Mr. Mercado is a 69 year old M with hx of uncontrolled T2DM and HTN referred for Diabetes management  date of diagnosis: At least for 4 years. HbA1c: July 2022 and results: 11.9%. Most recent 6% in April 2024  Last seen in July 2022 and was started on ozempic  No hx of pancreatitis  Has adjusted his diet and exercise  Per patient lost 20-25lbs   At 1am 146 4:40 139 5:29 am was 205 had a phone call 10:30 165  Current DM Regimen:. Metformin 500 mg BID  Ozempic 0.25 mg once weekly (Decrease by his PCP)  Was on 1 mg but was having low BG in 70s so he decreased it to 0.5 mg  No abdominal pain  No nausea or vomiting  Decreased appetite  Lisinopril 10 mg daily     Diabetes Surveillance: Eye exam: 2 weeks had a dialated eye exam  Foot exam/podiatrist: a year ago   Diabetes Complications:   Opthalmic: Has not been found to have any eye complications   Cardiovascular: no coronary artery bypass graft and no coronary artery disease. On atorvastatin LDL: 80  Renal: no nephropathy and no end stage renal disease. UACR 2 months ago  Neurologic: In both feet pain in both toes (started getting " Bunions)      BG well controlled. Was getting better control with ozempic 0.5 mg    Plan Increase ozempic 0.5 mg once weekly  Continue metformin 500 mg BID and if blood sugar better decrease to once daily  Continue to watch diet and exercise  Continue atorvastatin and lisinopril  Follow with ophthalmology and podiatry    RTC in 6 months    Problem List Items Addressed This Visit       Diabetes mellitus (Multi)    Relevant Medications    semaglutide 0.25 mg or 0.5 mg (2 mg/3 mL) pen injector

## 2024-07-11 ENCOUNTER — APPOINTMENT (OUTPATIENT)
Dept: ENDOCRINOLOGY | Facility: HOSPITAL | Age: 69
End: 2024-07-11
Payer: COMMERCIAL

## 2024-07-24 DIAGNOSIS — E11.9 TYPE 2 DIABETES MELLITUS WITHOUT COMPLICATION, UNSPECIFIED WHETHER LONG TERM INSULIN USE (MULTI): ICD-10-CM

## 2024-07-25 RX ORDER — FLASH GLUCOSE SENSOR
KIT MISCELLANEOUS
Qty: 6 EACH | Refills: 0 | Status: SHIPPED | OUTPATIENT
Start: 2024-07-25

## 2024-08-22 ENCOUNTER — APPOINTMENT (OUTPATIENT)
Dept: DERMATOLOGY | Facility: CLINIC | Age: 69
End: 2024-08-22
Payer: COMMERCIAL

## 2024-08-22 DIAGNOSIS — L21.9 SEBORRHEIC DERMATITIS: ICD-10-CM

## 2024-08-22 DIAGNOSIS — D18.01 CHERRY ANGIOMA: Primary | ICD-10-CM

## 2024-08-22 DIAGNOSIS — L85.3 XEROSIS CUTIS: ICD-10-CM

## 2024-08-22 PROCEDURE — 1159F MED LIST DOCD IN RCRD: CPT

## 2024-08-22 PROCEDURE — 3044F HG A1C LEVEL LT 7.0%: CPT

## 2024-08-22 PROCEDURE — 99204 OFFICE O/P NEW MOD 45 MIN: CPT

## 2024-08-22 PROCEDURE — 3062F POS MACROALBUMINURIA REV: CPT

## 2024-08-22 PROCEDURE — 4010F ACE/ARB THERAPY RXD/TAKEN: CPT

## 2024-08-22 NOTE — PROGRESS NOTES
Subjective   HPI: Nomi Mercado is a 69 y.o. male new patient who presents in office for evaluation and treatment of flaking of the left side of the nose and into the cheek region.  Not pruritic.  Small bump on left present for couple months sometimes pruritic.  Significantly pruritic area on the left posterior thigh.  Has been using Ora or Vaseline to the area which sometimes helps.    Phx of skin cancer - YES wildcard/NO: No  Fhx of skin cancer - Yes/No/Unsure: No  SPF use growing up - YES wildcard/NO: No  SPF use now - YES wildcard/NO: No  Any blistering sunburns - Yes/No/Unsure: No  Any tanning bed use - Yes/No/Unsure: No      ROS: No other skin or systemic complaints other than what is documented elsewhere in the note.    ALLERGIES: Sulfamethoxazole-trimethoprim and Sulfamethoxazole    SOCIAL:  reports that he has quit smoking. His smoking use included cigarettes. He started smoking about 49 years ago. He has a 49.7 pack-year smoking history. He has never used smokeless tobacco. He reports that he does not currently use alcohol. He reports that he does not use drugs.    Objective   Symmetric erythematous patches overlying greasy scales.      Left Abdomen (side) - Upper  Scattered 1-2 mm dome shaped bright red papules        Assessment/Plan   Cherry angioma  Left Abdomen (side) - Upper    Discussed benign nature and that these are superficial blood vessels. When traumatized these areas may bleed. No treatment is needed and observation is recommended.     Seborrheic dermatitis    Clinically consistent with seborrheic dermatitis.  I recommend ketoconazole 2% cream applied once daily.    Related Medications  ketoconazole (NIZOral) 2 % cream  Apply topically to nose once daily.    Xerosis cutis  Left Thigh - Posterior    Clinically appears as significantly dry skin.  Betamethasone valerate 14 days.  I recommend CeraVe a cream or lotion applied ad sky.    For all of my patients I recommend all free and  clear laundry detergent.  I do not recommend using any scented beads, fabric softener, or scented dryer sheets.  Gentle skin care products should be used.  Skin should be moisturized within 3 minutes of exiting shower with either lotion or cream applied to damp skin -  this should be done at least once daily.  My personal favorite moisturizer is CeraVe cream.  This is usually cheapest on [employer].  Other moisturizers that I like include Aveeno eczema therapy as well as La Roche Pose.      Related Medications  betamethasone valerate (Valisone) 0.1 % cream  Apply a thin layer to affected area on leg bid x14 days. DO NOT USE ON FACE OR GROIN.         FOLLOW UP: prn    The patient was encouraged to contact me with any further questions or concerns.  Chata Hou PA-C  8/26/2024

## 2024-08-23 RX ORDER — KETOCONAZOLE 20 MG/G
CREAM TOPICAL
Qty: 60 G | Refills: 2 | Status: SHIPPED | OUTPATIENT
Start: 2024-08-23

## 2024-08-23 RX ORDER — BETAMETHASONE VALERATE 1 MG/G
CREAM TOPICAL
Qty: 45 G | Refills: 0 | Status: SHIPPED | OUTPATIENT
Start: 2024-08-23

## 2024-09-25 DIAGNOSIS — H60.63 CHRONIC NON-INFECTIVE OTITIS EXTERNA OF BOTH EARS, UNSPECIFIED TYPE: ICD-10-CM

## 2024-09-25 RX ORDER — NEOMYCIN SULFATE, POLYMYXIN B SULFATE, HYDROCORTISONE 3.5; 10000; 1 MG/ML; [USP'U]/ML; MG/ML
3 SOLUTION/ DROPS AURICULAR (OTIC) 3 TIMES DAILY
Qty: 10 ML | Refills: 0 | Status: SHIPPED | OUTPATIENT
Start: 2024-09-25

## 2024-10-15 DIAGNOSIS — E11.9 TYPE 2 DIABETES MELLITUS WITHOUT COMPLICATION, UNSPECIFIED WHETHER LONG TERM INSULIN USE (MULTI): ICD-10-CM

## 2024-10-15 DIAGNOSIS — E11.9 TYPE 2 DIABETES MELLITUS WITHOUT COMPLICATION, WITHOUT LONG-TERM CURRENT USE OF INSULIN (MULTI): ICD-10-CM

## 2024-10-15 RX ORDER — FLASH GLUCOSE SENSOR
KIT MISCELLANEOUS
Qty: 6 EACH | Refills: 0 | Status: SHIPPED | OUTPATIENT
Start: 2024-10-15

## 2024-10-15 RX ORDER — FLASH GLUCOSE SENSOR
KIT MISCELLANEOUS
Qty: 6 EACH | Refills: 3 | Status: SHIPPED | OUTPATIENT
Start: 2024-10-15

## 2024-11-07 ENCOUNTER — APPOINTMENT (OUTPATIENT)
Dept: PRIMARY CARE | Facility: CLINIC | Age: 69
End: 2024-11-07
Payer: COMMERCIAL

## 2024-11-07 VITALS
SYSTOLIC BLOOD PRESSURE: 152 MMHG | HEART RATE: 76 BPM | WEIGHT: 280.4 LBS | HEIGHT: 76 IN | DIASTOLIC BLOOD PRESSURE: 83 MMHG | BODY MASS INDEX: 34.15 KG/M2

## 2024-11-07 DIAGNOSIS — E11.9 DIABETES MELLITUS TYPE 2 WITHOUT RETINOPATHY (MULTI): ICD-10-CM

## 2024-11-07 DIAGNOSIS — I10 ESSENTIAL HYPERTENSION: ICD-10-CM

## 2024-11-07 LAB — POC HEMOGLOBIN A1C: 6.4 % (ref 4.2–6.5)

## 2024-11-07 PROCEDURE — 1124F ACP DISCUSS-NO DSCNMKR DOCD: CPT | Performed by: FAMILY MEDICINE

## 2024-11-07 PROCEDURE — 3062F POS MACROALBUMINURIA REV: CPT | Performed by: FAMILY MEDICINE

## 2024-11-07 PROCEDURE — 3008F BODY MASS INDEX DOCD: CPT | Performed by: FAMILY MEDICINE

## 2024-11-07 PROCEDURE — 3079F DIAST BP 80-89 MM HG: CPT | Performed by: FAMILY MEDICINE

## 2024-11-07 PROCEDURE — 4010F ACE/ARB THERAPY RXD/TAKEN: CPT | Performed by: FAMILY MEDICINE

## 2024-11-07 PROCEDURE — 83036 HEMOGLOBIN GLYCOSYLATED A1C: CPT | Performed by: FAMILY MEDICINE

## 2024-11-07 PROCEDURE — 3077F SYST BP >= 140 MM HG: CPT | Performed by: FAMILY MEDICINE

## 2024-11-07 PROCEDURE — 99214 OFFICE O/P EST MOD 30 MIN: CPT | Performed by: FAMILY MEDICINE

## 2024-11-07 PROCEDURE — 1036F TOBACCO NON-USER: CPT | Performed by: FAMILY MEDICINE

## 2024-11-07 PROCEDURE — 1159F MED LIST DOCD IN RCRD: CPT | Performed by: FAMILY MEDICINE

## 2024-11-07 PROCEDURE — 3044F HG A1C LEVEL LT 7.0%: CPT | Performed by: FAMILY MEDICINE

## 2024-11-07 RX ORDER — HYDROCHLOROTHIAZIDE 12.5 MG/1
12.5 TABLET ORAL DAILY
Qty: 90 TABLET | Refills: 1 | Status: SHIPPED | OUTPATIENT
Start: 2024-11-07

## 2024-11-07 ASSESSMENT — ENCOUNTER SYMPTOMS
LOSS OF SENSATION IN FEET: 0
DEPRESSION: 0
OCCASIONAL FEELINGS OF UNSTEADINESS: 0

## 2024-11-07 NOTE — PROGRESS NOTES
Pt is here for fuv, pt has concerns of switching from ozempic to mounjaro.         Chief Complaint:  No chief complaint on file.  History Of Present Illness:   Nomi Mercado is a 69 y.o. male        Nomi Mercado is a 69 y.o. male with a history of HTN, DM2, right eye cataract removed and stent placed, gout, shingles, presenting for an office visit.     Last visit here: May 2024.         DM: blood sugars in the 300+. Sometimes he feels dizzy, also he feels sleepy. 10/28/21: 174 in the office. July 2022 update: his bs were in the 600+ and he was admitted. And then discharged and is following up here.   insluine glargine: 20U at night. agreed to go to 30U at night.   Nov 2023 update: bs in the lower 90s, after eating up to 180 and then they come back down. Has a CGM. On ozempic now, off insulin. Has an endocrinologist.   Nov 2024 update: he will ask his endocrinologist about inc ozempic to help with weight loss. 11/7/24: hgA1c was 6.4% in the office.        HTN- 140s/90s at home.   - on lisinopril now.  - Nov 2024 visit- rec adding hydrochlorothiazide back to lisinopril      Healthcare team:  Endocrinology - Dr. Taylor  Dermatology - Ameya, LYNDA  Ophthalmology - Dr. Pappas  Podiatry           FH: no FH cancer. No FH MI.     Tobacco: never        Flu shot:  - declines          Colonoscopy: scheduled for Dec 2024.     COVID: vaccinated.     June 2, 2020- first shingrix vaccine.  Sept 2, 2020- second shingrix vaccine.        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     Mood: pretty good     Sleep: takes z-quil to sleep     Sports: exercises on treadmill.                             Review of Systems:     Negative  Constitutional:   - fever   - chills   - night sweats  - unexpected weight change       Eyes:   - loss of vision  - double vision  - floaters     Ear/Nose/Throat/Mouth:   - sore throat  - hearing changes  - sinus congestion     Cardiovascular:   - chest pain  - chest heaviness  - palpitations  -  "swelling in ankles          Respiratory:   - shortness of breath  - difficulty breathing  - frequent cough  - wheezing     Neurological:   - loss of consciousness  - tremors  - dizzy spells  - numbness   - tingling     Gastrointestinal:   - abdominal pain  - nausea  - vomiting  - constipation  - diarrhea  - bloody stools  - loss of bowel control  - indigestion  - heartburn  - sour taste in throat when waking up     Genitourinary:   - urinary incontinence  - increased urinary frequency  - painful urination  - blood in urine     Skin:   - Rash  - lumps or bumps  - worrisome moles     Endocrine:   - excessive thirst  - feeling too hot  - feeling too cold        Hematologic/Lymphatic:   - swollen glands  - blood clotting problems  - easy bruising.     Psychological:   - feelings of depression  - feelings of anxiety.          Positive  Psychological:   - feeling generally happy  - feeling safe at home            Last Recorded Vitals:  Vitals:    11/07/24 1406   BP: (!) 182/93   Pulse: 76   Weight: 127 kg (280 lb 6.4 oz)   Height: 1.93 m (6' 4\")        Past Medical History:  He has no past medical history on file.     Past Surgical History:  He has a past surgical history that includes Other surgical history (01/24/2020).     Social History:  He reports that he has quit smoking. His smoking use included cigarettes. He started smoking about 49 years ago. He has a 49.8 pack-year smoking history. He has never used smokeless tobacco. He reports that he does not currently use alcohol. He reports that he does not use drugs.     Family History:  Family History   Problem Relation Name Age of Onset    No Known Problems Mother      No Known Problems Father      No Known Problems Sister      No Known Problems Brother       Allergies:  Sulfamethoxazole-trimethoprim and Sulfamethoxazole     Outpatient Medications:  Current Outpatient Medications   Medication Instructions    aspirin 81 mg EC tablet 1 tablet, oral, Daily    atorvastatin " (Lipitor) 20 mg tablet 1 tablet, oral, Nightly    betamethasone valerate (Valisone) 0.1 % cream Apply a thin layer to affected area on leg bid x14 days. DO NOT USE ON FACE OR GROIN.    FreeStyle Brittany 2 Sensor kit USE AS DIRECTED TO MONITOR BLOOD GLUCOSE    FreeStyle Brittany 2 Sensor kit Use as instructed to check blood sugars change sensors every 14 as directed    indomethacin (INDOCIN) 25 mg, oral, 3 times daily PRN    ketoconazole (NIZOral) 2 % cream Apply topically to nose once daily.    lisinopril 10 mg, oral, Daily    metFORMIN (GLUCOPHAGE) 500 mg, oral, Every 12 hours, With food     neomycin-polymyxin-HC (Cortisporin) otic solution 3 drops, otic (ear), 3 times daily    semaglutide 0.5 mg, subcutaneous, Once Weekly    tadalafil (Cialis) 5 mg tablet 1 tablet, oral, Daily        Physical Exam:  GENERAL: Well developed, well nourished, alert and cooperative, and appears to be in no acute distress.     PSYCH: mood pleasant and appropriate     HEAD: normocephalic     EYES: PERRL, EOMI. vision is grossly intact.          NOSE:  Nares patent b/l.   No bleeding nasal polyps. No nasal discharge.     THROAT:    Oropharynx clear.  No inflammation, swelling, exudate, or lesions.   Teeth and gingiva in good general condition.     NECK: Neck supple, non-tender.   No masses, no thyromegaly.  No anterior cervical lymphadenopathy.     CARDIAC:   RRR.   No murmur.       LUNGS: Good respiratory effort.  Clear to auscultation b/l without rales, rhonchi, wheezing.     ABD: soft, nontender  no masses palpated.     GAIT: Normal             EXTREMITIES: All 4 extremities are warm and well perfused.        NEUROLOGICAL: Strength and sensation symmetric and intact throughout all 4 extremities.        1. Diabetes mellitus type 2 without retinopathy (Multi)  POCT glycosylated hemoglobin (Hb A1C) manually resulted               Elevated blood pressure, continue checking at home. Adding hydrochlorothiazide back to your lisinopril. Call the  office if your bp are over 140 for the top number and we can consider changing medications.       Continue with your endocrinologist and your other specialists.         Follow-up in 6 months.     Jamal Madrid, DO

## 2024-11-19 DIAGNOSIS — E11.9 TYPE 2 DIABETES MELLITUS WITHOUT COMPLICATION, WITHOUT LONG-TERM CURRENT USE OF INSULIN (MULTI): Primary | ICD-10-CM

## 2024-11-19 RX ORDER — TIRZEPATIDE 7.5 MG/.5ML
7.5 INJECTION, SOLUTION SUBCUTANEOUS WEEKLY
Qty: 2 ML | Refills: 3 | Status: SHIPPED | OUTPATIENT
Start: 2024-11-19

## 2024-12-04 ENCOUNTER — TELEPHONE (OUTPATIENT)
Dept: PRIMARY CARE | Facility: CLINIC | Age: 69
End: 2024-12-04
Payer: COMMERCIAL

## 2024-12-04 DIAGNOSIS — Z12.11 COLON CANCER SCREENING: Primary | ICD-10-CM

## 2024-12-18 ENCOUNTER — APPOINTMENT (OUTPATIENT)
Dept: PRIMARY CARE | Facility: CLINIC | Age: 69
End: 2024-12-18
Payer: COMMERCIAL

## 2025-01-28 DIAGNOSIS — I10 ESSENTIAL HYPERTENSION: ICD-10-CM

## 2025-01-28 RX ORDER — LISINOPRIL 10 MG/1
10 TABLET ORAL DAILY
Qty: 90 TABLET | Refills: 0 | OUTPATIENT
Start: 2025-01-28

## 2025-02-25 ENCOUNTER — APPOINTMENT (OUTPATIENT)
Dept: PRIMARY CARE | Facility: CLINIC | Age: 70
End: 2025-02-25
Payer: COMMERCIAL

## 2025-02-25 VITALS — HEIGHT: 76 IN | BODY MASS INDEX: 34.13 KG/M2

## 2025-02-25 DIAGNOSIS — E11.9 DIABETES MELLITUS TYPE 2 WITHOUT RETINOPATHY (MULTI): Primary | ICD-10-CM

## 2025-02-25 PROCEDURE — 4010F ACE/ARB THERAPY RXD/TAKEN: CPT | Performed by: FAMILY MEDICINE

## 2025-02-25 PROCEDURE — 99213 OFFICE O/P EST LOW 20 MIN: CPT | Performed by: FAMILY MEDICINE

## 2025-02-25 RX ORDER — BLOOD-GLUCOSE SENSOR
1 EACH MISCELLANEOUS DAILY
Qty: 2 EACH | Refills: 3 | Status: SHIPPED | OUTPATIENT
Start: 2025-02-25

## 2025-02-25 NOTE — PROGRESS NOTES
Pt is here for fuv, pt has concerns of switching from ozempic to mounjaro.         Chief Complaint:  No chief complaint on file.  History Of Present Illness:   Nomi Mercado is a 70 y.o. male           history of HTN, DM2, right eye cataract removed and stent placed, gout, shingles, presenting for an office visit.     Last visit here: Nov 2024      Virtual or Telephone Consent    An interactive audio and video telecommunication system which permits real time communications between the patient (at the originating site) and provider (at the distant site) was utilized to provide this telehealth service.   Verbal consent was requested and obtained from Nomi Mercado on this date, 02/25/25 for a telehealth visit.       Recent virus- feeling better, simply has a runny nose now that is improving.         DM: blood sugars in the 300+. Sometimes he feels dizzy, also he feels sleepy. 10/28/21: 174 in the office. July 2022 update: his bs were in the 600+ and he was admitted. And then discharged and is following up here.   insluine glargine: 20U at night. agreed to go to 30U at night.   Nov 2023 update: bs in the lower 90s, after eating up to 180 and then they come back down. Has a CGM. On ozempic now, off insulin. Has an endocrinologist.   Nov 2024 update: he will ask his endocrinologist about inc ozempic to help with weight loss. 11/7/24: hgA1c was 6.4% in the office.   Feb 2025 update: blood sugars are good, he is seeing endocrinology. Home blood sugars in the 90s to 100s. He is taking lipitor.        HTN- 140s/90s at home.   - on lisinopril now.  - Nov 2024 visit- rec adding hydrochlorothiazide back to lisinopril  - Feb 2025 visit: 130s/80s. Taking lisinopril.       Healthcare team:  Endocrinology - Dr. Taylor  Dermatology - LYNDA Hou  Ophthalmology - Dr. Pappas  Podiatry           FH: no FH cancer. No FH MI.     Tobacco: never        Flu shot:  - declines          Colonoscopy: scheduled May 5, 2025.      COVID: vaccinated.     June 2, 2020- first shingrix vaccine.  Sept 2, 2020- second shingrix vaccine.        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     Mood: pretty good     Sleep: takes z-quil to sleep     Sports: exercises on treadmill.                             Review of Systems:     Negative  Constitutional:   - fever   - chills   - night sweats  - unexpected weight change       Eyes:   - loss of vision  - double vision  - floaters     Ear/Nose/Throat/Mouth:   - sore throat  - hearing changes  - sinus congestion     Cardiovascular:   - chest pain  - chest heaviness  - palpitations  - swelling in ankles          Respiratory:   - shortness of breath  - difficulty breathing  - frequent cough  - wheezing     Neurological:   - loss of consciousness  - tremors  - dizzy spells  - numbness   - tingling     Gastrointestinal:   - abdominal pain  - nausea  - vomiting  - constipation  - diarrhea  - bloody stools  - loss of bowel control  - indigestion  - heartburn  - sour taste in throat when waking up     Genitourinary:   - urinary incontinence  - increased urinary frequency  - painful urination  - blood in urine     Skin:   - Rash  - lumps or bumps  - worrisome moles     Endocrine:   - excessive thirst  - feeling too hot  - feeling too cold        Hematologic/Lymphatic:   - swollen glands  - blood clotting problems  - easy bruising.     Psychological:   - feelings of depression  - feelings of anxiety.          Positive  Psychological:   - feeling generally happy  - feeling safe at home            Last Recorded Vitals:  There were no vitals filed for this visit.       Past Medical History:  He has no past medical history on file.     Past Surgical History:  He has a past surgical history that includes Other surgical history (01/24/2020).     Social History:  He reports that he has quit smoking. His smoking use included cigarettes. He started smoking about 50 years ago. He has a 50.2 pack-year smoking history. He has  never used smokeless tobacco. He reports that he does not currently use alcohol. He reports that he does not use drugs.     Family History:  Family History   Problem Relation Name Age of Onset    No Known Problems Mother      No Known Problems Father      No Known Problems Sister      No Known Problems Brother       Allergies:  Sulfamethoxazole-trimethoprim and Sulfamethoxazole     Outpatient Medications:  Current Outpatient Medications   Medication Instructions    aspirin 81 mg EC tablet 1 tablet, oral, Daily    atorvastatin (Lipitor) 20 mg tablet 1 tablet, oral, Nightly    betamethasone valerate (Valisone) 0.1 % cream Apply a thin layer to affected area on leg bid x14 days. DO NOT USE ON FACE OR GROIN.    FreeStyle Brittany 2 Sensor kit USE AS DIRECTED TO MONITOR BLOOD GLUCOSE    FreeStyle Brittany 2 Sensor kit Use as instructed to check blood sugars change sensors every 14 as directed    hydroCHLOROthiazide (MICROZIDE) 12.5 mg, oral, Daily    indomethacin (INDOCIN) 25 mg, oral, 3 times daily PRN    ketoconazole (NIZOral) 2 % cream Apply topically to nose once daily.    lisinopril 10 mg, oral, Daily    metFORMIN (GLUCOPHAGE) 500 mg, oral, Every 12 hours, With food     Mounjaro 7.5 mg, subcutaneous, Weekly    neomycin-polymyxin-HC (Cortisporin) otic solution 3 drops, otic (ear), 3 times daily    tadalafil (Cialis) 5 mg tablet 1 tablet, oral, Daily        Physical Exam:  GENERAL: Well developed, well nourished, alert and cooperative, and appears to be in no acute distress.     PSYCH: mood pleasant and appropriate     HEAD: normocephalic     EYES: PERRL, EOMI. vision is grossly intact.        LUNGS: Good respiratory effort.         1. Diabetes mellitus type 2 without retinopathy (Multi)                   Virtual visit today.    Freestyle 3 plus brittany rx done today.      No blood work needed.         Continue with your endocrinologist and your other specialists.       Ok to keep appt with me in May 2025.           Jamal Madrid, DO

## 2025-05-05 ENCOUNTER — APPOINTMENT (OUTPATIENT)
Dept: GASTROENTEROLOGY | Facility: HOSPITAL | Age: 70
End: 2025-05-05
Payer: MEDICARE

## 2025-05-07 ENCOUNTER — APPOINTMENT (OUTPATIENT)
Dept: PRIMARY CARE | Facility: CLINIC | Age: 70
End: 2025-05-07
Payer: COMMERCIAL

## 2025-05-07 VITALS
HEIGHT: 76 IN | HEART RATE: 87 BPM | DIASTOLIC BLOOD PRESSURE: 90 MMHG | BODY MASS INDEX: 31.9 KG/M2 | WEIGHT: 262 LBS | SYSTOLIC BLOOD PRESSURE: 140 MMHG

## 2025-05-07 DIAGNOSIS — E11.9 DIABETES MELLITUS TYPE 2 WITHOUT RETINOPATHY (MULTI): Primary | ICD-10-CM

## 2025-05-07 DIAGNOSIS — H60.63 CHRONIC NON-INFECTIVE OTITIS EXTERNA OF BOTH EARS, UNSPECIFIED TYPE: ICD-10-CM

## 2025-05-07 PROCEDURE — 3008F BODY MASS INDEX DOCD: CPT | Performed by: FAMILY MEDICINE

## 2025-05-07 PROCEDURE — 3080F DIAST BP >= 90 MM HG: CPT | Performed by: FAMILY MEDICINE

## 2025-05-07 PROCEDURE — 1159F MED LIST DOCD IN RCRD: CPT | Performed by: FAMILY MEDICINE

## 2025-05-07 PROCEDURE — 4010F ACE/ARB THERAPY RXD/TAKEN: CPT | Performed by: FAMILY MEDICINE

## 2025-05-07 PROCEDURE — 99214 OFFICE O/P EST MOD 30 MIN: CPT | Performed by: FAMILY MEDICINE

## 2025-05-07 PROCEDURE — 3077F SYST BP >= 140 MM HG: CPT | Performed by: FAMILY MEDICINE

## 2025-05-07 PROCEDURE — 1036F TOBACCO NON-USER: CPT | Performed by: FAMILY MEDICINE

## 2025-05-07 RX ORDER — HYDROCORTISONE 25 MG/G
CREAM TOPICAL
COMMUNITY
Start: 2025-04-08

## 2025-05-07 RX ORDER — CICLOPIROX 1 G/100ML
SHAMPOO TOPICAL
COMMUNITY
Start: 2025-04-08

## 2025-05-07 RX ORDER — CICLOPIROX OLAMINE 7.7 MG/G
CREAM TOPICAL
COMMUNITY
Start: 2025-04-08

## 2025-05-07 RX ORDER — NEOMYCIN SULFATE, POLYMYXIN B SULFATE, HYDROCORTISONE 3.5; 10000; 1 MG/ML; [USP'U]/ML; MG/ML
3 SOLUTION/ DROPS AURICULAR (OTIC) 3 TIMES DAILY
Qty: 10 ML | Refills: 0 | Status: SHIPPED | OUTPATIENT
Start: 2025-05-07

## 2025-05-07 ASSESSMENT — ENCOUNTER SYMPTOMS
OCCASIONAL FEELINGS OF UNSTEADINESS: 0
DEPRESSION: 0
LOSS OF SENSATION IN FEET: 0

## 2025-05-07 ASSESSMENT — PATIENT HEALTH QUESTIONNAIRE - PHQ9
2. FEELING DOWN, DEPRESSED OR HOPELESS: NOT AT ALL
SUM OF ALL RESPONSES TO PHQ9 QUESTIONS 1 AND 2: 0
1. LITTLE INTEREST OR PLEASURE IN DOING THINGS: NOT AT ALL

## 2025-05-07 NOTE — PROGRESS NOTES
"Patient presents for fuv.  Patient is requesting medications refills as well as a handicap placard.            Chief Complaint:  No chief complaint on file.  History Of Present Illness:   Nomi Mercado is a 70 y.o. male           history of HTN, DM2, right eye cataract removed and stent placed, gout, shingles, presenting for an office visit.     Last visit here: Feb 2025 virtual visit.      Left thigh pain  - bothering getting in and out of the car certain days. Does self massage.         Ear drops- pt requesting refill on cortisporin.      Paperwork: pt requesting handicap placard- agreed on 1 year placard for DM 5/7/25 visit.  - he's trying to get life insurance. At 5/7/25 visit- pt requested removal of dx: \"diabetic neuropathy\"      DM: blood sugars in the 300+. Sometimes he feels dizzy, also he feels sleepy. 10/28/21: 174 in the office. July 2022 update: his bs were in the 600+ and he was admitted. And then discharged and is following up here.   insluine glargine: 20U at night. agreed to go to 30U at night.   Nov 2023 update: bs in the lower 90s, after eating up to 180 and then they come back down. Has a CGM. On ozempic now, off insulin. Has an endocrinologist.   Nov 2024 update: he will ask his endocrinologist about inc ozempic to help with weight loss. 11/7/24: hgA1c was 6.4% in the office.   Feb 2025 update: blood sugars are good, he is seeing endocrinology. Home blood sugars in the 90s to 100s. He is taking lipitor.   May 2025 update: mounjaro from endocrinology is very helpful. He is not taking metformin. Taking statin.       HTN- 140s/90s at home.   - on lisinopril now.  - Nov 2024 visit- rec adding hydrochlorothiazide back to lisinopril  - Feb 2025 visit: 130s/80s. Taking lisinopril.       Healthcare team:  Endocrinology - Dr. Taylor  Dermatology - LYNDA Hou  Ophthalmology - Dr. Pappas  Podiatry           FH: no FH cancer. No FH MI.     Tobacco: never        Flu shot:  - declines        " "  Colonoscopy: will reschedule.     COVID: vaccinated.     June 2, 2020- first shingrix vaccine.  Sept 2, 2020- second shingrix vaccine.        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     Mood: pretty good     Sleep: takes z-quil to sleep     Sports: exercises on treadmill.                             Review of Systems:     Negative  Constitutional:   - fever   - chills   - night sweats  - unexpected weight change       Eyes:   - loss of vision  - double vision  - floaters     Ear/Nose/Throat/Mouth:   - sore throat  - hearing changes  - sinus congestion     Cardiovascular:   - chest pain  - chest heaviness  - palpitations  - swelling in ankles          Respiratory:   - shortness of breath  - difficulty breathing  - frequent cough  - wheezing     Neurological:   - loss of consciousness  - tremors  - dizzy spells  - numbness   - tingling     Gastrointestinal:   - abdominal pain  - nausea  - vomiting  - constipation  - diarrhea  - bloody stools  - loss of bowel control  - indigestion  - heartburn  - sour taste in throat when waking up     Genitourinary:   - urinary incontinence  - increased urinary frequency  - painful urination  - blood in urine     Skin:   - Rash  - lumps or bumps  - worrisome moles     Endocrine:   - excessive thirst  - feeling too hot  - feeling too cold        Hematologic/Lymphatic:   - swollen glands  - blood clotting problems  - easy bruising.     Psychological:   - feelings of depression  - feelings of anxiety.          Positive  Psychological:   - feeling generally happy  - feeling safe at home            Last Recorded Vitals:  Vitals:    05/07/25 1357   BP: (!) 154/96   BP Location: Left leg   Patient Position: Sitting   BP Cuff Size: Adult   Pulse: 87   Weight: 119 kg (262 lb)   Height: 1.93 m (6' 4\")          Past Medical History:  He has a past medical history of Diabetes mellitus (Multi) and Hypertension.     Past Surgical History:  He has a past surgical history that includes Other " surgical history (01/24/2020).     Social History:  He reports that he quit smoking about 33 years ago. His smoking use included cigarettes. He started smoking about 50 years ago. He has a 17 pack-year smoking history. He has never used smokeless tobacco. He reports current alcohol use. He reports that he does not use drugs.     Family History:  Family History   Problem Relation Name Age of Onset    No Known Problems Mother      No Known Problems Father      No Known Problems Sister      No Known Problems Brother       Allergies:  Sulfamethoxazole-trimethoprim and Sulfamethoxazole     Outpatient Medications:  Current Outpatient Medications   Medication Instructions    aspirin 81 mg EC tablet 1 tablet, Daily    atorvastatin (Lipitor) 20 mg tablet 1 tablet, Nightly    betamethasone valerate (Valisone) 0.1 % cream Apply a thin layer to affected area on leg bid x14 days. DO NOT USE ON FACE OR GROIN.    blood-glucose sensor (FreeStyle Brittany 3 Plus Sensor) device 1 each, miscellaneous, Daily, Use daily to check blood glucose    ciclopirox (Loprox) 0.77 % cream APPLY TO THE FACE TWICE DAILY    ciclopirox 1 % shampoo SHAMPOO AS NEEDED. LEAVE ON 5 MINUTES BEFORE RINSING. AVOIDING GETTING INTO EYES    hydrocortisone 2.5 % cream APPLY TO FACE TWICE DAILY, 2 WEEKS ON/ONE WEEK OFF AS NEEDED. MAY REPEAT CYCLE    indomethacin (INDOCIN) 25 mg, oral, 3 times daily PRN    ketoconazole (NIZOral) 2 % cream Apply topically to nose once daily.    lisinopril 10 mg, oral, Daily    metFORMIN (GLUCOPHAGE) 500 mg, oral, Every 12 hours, With food     Mounjaro 7.5 mg, subcutaneous, Weekly    neomycin-polymyxin-HC (Cortisporin) otic solution 3 drops, otic (ear), 3 times daily        Physical Exam:  Physical Exam:  GENERAL: Well developed, well nourished, alert and cooperative, and appears to be in no acute distress.     PSYCH: mood pleasant and appropriate     HEAD: normocephalic     EYES: PERRL, EOMI. vision is grossly intact.          NOSE:   Nares patent b/l.   No bleeding nasal polyps. No nasal discharge.     THROAT:    Oropharynx clear.       NECK: Neck supple, non-tender.   No masses, no thyromegaly.  No anterior cervical lymphadenopathy.     CARDIAC:   RRR.        LUNGS: Good respiratory effort.  Clear to auscultation b/l without rales, rhonchi, wheezing.     ABD: soft, nontender       GAIT: Normal      EXTREMITIES: All 4 extremities are warm and well perfused.   Peripheral pulses intact.   No varicosities.   No cyanosis, no pallor.   No peripheral edema.     NEUROLOGICAL: Strength and sensation symmetric and intact throughout all 4 extremities.  CN II-XII grossly intact.           1. Diabetes mellitus type 2 without retinopathy (Multi)        2. Chronic non-infective otitis externa of both ears, unspecified type                   [facility] rx today      Handicap placard printed      No blood work needed.         Continue with your endocrinologist and your other specialists.   - make sure to make eye/foot appts yearly.       Follow up 6 months.          Jamal Madrid, DO

## 2025-06-27 DIAGNOSIS — I10 ESSENTIAL HYPERTENSION: ICD-10-CM

## 2025-07-01 DIAGNOSIS — E11.9 TYPE 2 DIABETES MELLITUS WITHOUT COMPLICATION, UNSPECIFIED WHETHER LONG TERM INSULIN USE: ICD-10-CM

## 2025-07-01 RX ORDER — LISINOPRIL 10 MG/1
10 TABLET ORAL DAILY
Qty: 90 TABLET | Refills: 0 | Status: SHIPPED | OUTPATIENT
Start: 2025-07-01

## 2025-07-01 RX ORDER — FLASH GLUCOSE SENSOR
KIT MISCELLANEOUS
Qty: 6 EACH | Refills: 0 | Status: SHIPPED | OUTPATIENT
Start: 2025-07-01

## 2025-07-02 DIAGNOSIS — E11.9 DIABETES MELLITUS TYPE 2 WITHOUT RETINOPATHY (MULTI): ICD-10-CM

## 2025-07-02 RX ORDER — BLOOD-GLUCOSE SENSOR
1 EACH MISCELLANEOUS DAILY
Qty: 2 EACH | Refills: 3 | Status: SHIPPED | OUTPATIENT
Start: 2025-07-02

## 2025-08-20 ENCOUNTER — APPOINTMENT (OUTPATIENT)
Dept: PRIMARY CARE | Facility: CLINIC | Age: 70
End: 2025-08-20
Payer: MEDICARE

## 2025-09-05 ENCOUNTER — TELEPHONE (OUTPATIENT)
Dept: PRIMARY CARE | Facility: CLINIC | Age: 70
End: 2025-09-05
Payer: MEDICARE

## 2025-11-05 ENCOUNTER — APPOINTMENT (OUTPATIENT)
Dept: PRIMARY CARE | Facility: CLINIC | Age: 70
End: 2025-11-05
Payer: MEDICARE